# Patient Record
Sex: FEMALE | Race: WHITE | URBAN - METROPOLITAN AREA
[De-identification: names, ages, dates, MRNs, and addresses within clinical notes are randomized per-mention and may not be internally consistent; named-entity substitution may affect disease eponyms.]

---

## 2017-04-21 ENCOUNTER — IMPORTED ENCOUNTER (OUTPATIENT)
Dept: URBAN - METROPOLITAN AREA CLINIC 50 | Facility: CLINIC | Age: 72
End: 2017-04-21

## 2017-12-08 ENCOUNTER — IMPORTED ENCOUNTER (OUTPATIENT)
Dept: URBAN - METROPOLITAN AREA CLINIC 50 | Facility: CLINIC | Age: 72
End: 2017-12-08

## 2018-08-03 ENCOUNTER — IMPORTED ENCOUNTER (OUTPATIENT)
Dept: URBAN - METROPOLITAN AREA CLINIC 50 | Facility: CLINIC | Age: 73
End: 2018-08-03

## 2018-08-13 ENCOUNTER — IMPORTED ENCOUNTER (OUTPATIENT)
Dept: URBAN - METROPOLITAN AREA CLINIC 50 | Facility: CLINIC | Age: 73
End: 2018-08-13

## 2018-10-09 NOTE — PATIENT DISCUSSION
REFRACTIVE ERROR, OU - DISCUSSED OPTION OF CORRECTING AT THE TIME OF CATARACT SURGERY. PT UNDERSTANDS AND ELECTS TO CONSIDER HIS OPTIONS. DISCUSSED BEST QUALITY OF DISTANCE VISION AND PATIENT OKAY WITH WEARING READING GLASSES FOR NEAR.

## 2018-10-09 NOTE — PATIENT DISCUSSION
Chest Wall Pain: Costochondritis    The chest pain that you have had today is caused by costochondritis. This condition is caused by an inflammation of the cartilage joining your ribs to your breastbone. It is not caused by heart or lung problems. The inflammation may have been brought on by a blow to the chest, lifting heavy objects, intense exercise, or an illness that made you cough and sneeze. It often occurs during times of emotional stress. It can be painful, but it is not dangerous. It usually goes away in 1 to 2 weeks. But it may happen again. Rarely, a more serious condition may cause symptoms similar to costochondritis. That’s why it’s important to watch for the warning signs listed below.  Home care  Follow these guidelines when caring for yourself at home:  · If you feel that emotional stress is a cause of your condition, try to figure out the sources of that stress. It may not be obvious! Learn ways to deal with the stress in your life. This can include regular exercise, muscle relaxation, meditation, or simply taking time out for yourself. For more information about this, talk with your health care provider. Or go to your local library and look at books on “stress reduction.”  · You may use acetaminophen or ibuprofen to control pain, unless another pain medicine was prescribed. If you have liver disease or ever had a stomach ulcer, talk with your health care provider before using these medicines.  · You can also help ease pain by using a hot, wet compress or heating pad. Use this with or without a medicated skin cream that helps relieves pain.  · Do stretching exercise as advised by your provider.  · Take any prescribed medicines as directed.  Follow-up care  Follow up with your health care provider, or as advised, if you do not start to get better in the next 2 days.  When to seek medical advice  Call your health care provider right away if any of these occur:  · A change in the type of pain. Call  Refractive Error Counseling, OU -  I have discussed the options for refractive surgery to decrease dependency on glasses and/or contact lenses after cataract surgery. It was discussed that while the success rate of cataract surgery is high (approximately 99 percent), success being defined as removing a cloudy lens and replacing it with a clear artificial lens, the success rate of getting the prescription corrected to meet or approximate the patients goals (within 0.5 diopters) is lower (about 75-80%). These options include: correction of astigmatism with limbal relaxing incision(s), LenSx arcuate incision(s) and/or TORIC IOL, monovision, nanovision, multifocal IOL, and EDOF IOL . It was emphasized to the patient that the goal of reducing spectacle dependence not spectacle freedom is more realistic. The patient understands it is possible they may still need a weak spectacle prescription and/or further refractive surgery to achieve their visual goal after surgery. It was explained that in some cases further refractive surgery may not be possible and glasses and/or contacts may be needed to achieve their best vision. No visual outcome was guaranteed. if it feels different, becomes more serious, lasts longer, or spreads into your shoulder, arm, neck, jaw, or back.  · Shortness of breath or pain gets worse when you breathe  · Weakness, dizziness, or fainting  · Cough with dark-colored sputum (phlegm) or blood  · Abdominal pain  · Dark red or black stools  · Fever of 100.4ºF (38ºC) or higher, or as directed by your health care provider  © 6202-0419 [employer]. [address] [place] [address]. All rights reserved. This information is not intended as a substitute for professional medical care. Always follow your healthcare professional's instructions.    Take Naproxen as directed with food as needed for pain. May apply heat or ice for comfort. Follow up with PCP in 7-10 days if symptoms do not improve, sooner if symptoms worsen.

## 2018-10-09 NOTE — PATIENT DISCUSSION
CATARACT, OU - VISUALLY SIGNIFICANT OD &gt; OS. SCHEDULE SX OD THEN LATER IN OS IF VISUAL SYMPTOMS PERSIST.  GLS RX GIVEN TO FILL IF DESIRES IN THE EVENT PT DOES NOT PROCEED W/ SX.

## 2018-10-09 NOTE — PATIENT DISCUSSION
Surgery  Counseling: I have discussed the option of glasses versus cataract surgery versus following. It was explained that when vision no longer meets the patient's visual needs and a new prescription for glasses is not likely to improve the patient's visual symptoms, the option of cataract surgery is a reasonable next step. It was explained that there is no guarantee that removing the cataract will improve their visual symptoms, however; it is believed that the cataract is contributing to the patient's visual impairment and surgery may significantly improve both the visual and functional status of the patient. The risks, benefits and alternatives of surgery were discussed with the patient. After this discussion, the patient desires to proceed with cataract surgery with implantation of an intraocular lens to improve vision and improving night vision.

## 2018-10-15 NOTE — PATIENT DISCUSSION
REFRACTIVE ERROR- OPTS DISC W/ PT. PT UNDERSTANDS AND ELECTS TO PROCEED W/ REFRACTIVE CATARACT SURGERY .

## 2018-10-25 NOTE — PATIENT DISCUSSION
Surgery  Counseling: I have discussed the option of glasses versus cataract surgery versus following . It was explained that when vision no longer meets the patient's visual needs and a new prescription for glasses is not likely to improve all of the patient's visual symptoms, the option of cataract surgery is a reasonable next step. It was explained that there is no guarantee that removing the cataract will improve their visual symptoms, however; it is believed that the cataract is contributing to the patient's visual impairment and surgery may significantly improve both the visual and functional status of the patient. The risks, benefits and alternatives of surgery were discussed with the patient. After this discussion, the patient desires to proceed with cataract surgery with implantation of an intraocular lens to improve vision to drive safely, read small print, watch TV and golf.

## 2018-10-25 NOTE — PATIENT DISCUSSION
***This patient had refractive cataract surgery performed. A monofocal IOL was placed to achieve a target refraction of plano (which should provide them with satisfactory distance vision.

## 2019-02-08 ENCOUNTER — IMPORTED ENCOUNTER (OUTPATIENT)
Dept: URBAN - METROPOLITAN AREA CLINIC 50 | Facility: CLINIC | Age: 74
End: 2019-02-08

## 2019-07-19 ENCOUNTER — IMPORTED ENCOUNTER (OUTPATIENT)
Dept: URBAN - METROPOLITAN AREA CLINIC 50 | Facility: CLINIC | Age: 74
End: 2019-07-19

## 2019-07-19 NOTE — PATIENT DISCUSSION
"""Continue Artificial tears both eyes two - four times a day ."" ""Start Warm compresses both eyes twice a day . """

## 2020-01-24 ENCOUNTER — IMPORTED ENCOUNTER (OUTPATIENT)
Dept: URBAN - METROPOLITAN AREA CLINIC 50 | Facility: CLINIC | Age: 75
End: 2020-01-24

## 2020-07-24 ENCOUNTER — IMPORTED ENCOUNTER (OUTPATIENT)
Dept: URBAN - METROPOLITAN AREA CLINIC 50 | Facility: CLINIC | Age: 75
End: 2020-07-24

## 2021-03-12 ENCOUNTER — IMPORTED ENCOUNTER (OUTPATIENT)
Dept: URBAN - METROPOLITAN AREA CLINIC 50 | Facility: CLINIC | Age: 76
End: 2021-03-12

## 2021-04-18 ASSESSMENT — VISUAL ACUITY
OD_CC: 20/30
OD_CC: J5
OS_CC: 20/25-2
OS_OTHER: 20/70. 20/80.
OD_CC: 20/30
OS_CC: J2@ 15 IN
OS_BAT: 20/70
OS_OTHER: >20/400.
OS_CC: 20/30
OD_CC: 20/30+2
OD_PH: @ 14 IN
OD_BAT: 20/60
OD_CC: J2@ 14 IN
OS_CC: J2
OS_CC: 20/25
OS_CC: 20/30
OS_CC: 20/40
OD_OTHER: >20/400.
OD_CC: J2@ 16 IN
OD_CC: 20/25-2
OD_CC: J1@ 14 IN
OS_PH: @ 14 IN
OS_CC: 20/30
OS_CC: 20/30
OS_CC: J1@ 14 IN
OD_CC: 20/25
OD_CC: 20/25-2
OD_PH: @ 15 IN
OD_CC: J2
OS_CC: J5
OD_CC: J2
OS_BAT: 20/70
OS_CC: J2
OD_CC: 20/30-2
OS_OTHER: 20/70. 20/>400.
OS_CC: 20/25-3
OD_CC: 20/40
OS_CC: 20/30
OD_BAT: 20/>400
OD_OTHER: 20/60. 20/80.
OD_OTHER: 20/>400.
OS_CC: 20/40
OD_CC: 20/25-2
OS_PH: @ 15 IN
OD_CC: J2@ 15 IN
OS_CC: J2@ 14 IN
OS_CC: J2@ 16 IN

## 2021-04-18 ASSESSMENT — TONOMETRY
OS_IOP_MMHG: 12
OS_IOP_MMHG: 14
OS_IOP_MMHG: 16
OS_IOP_MMHG: 13
OS_IOP_MMHG: 15
OD_IOP_MMHG: 14
OS_IOP_MMHG: 14
OS_IOP_MMHG: 15
OD_IOP_MMHG: 14
OD_IOP_MMHG: 15
OD_IOP_MMHG: 13
OS_IOP_MMHG: 12
OD_IOP_MMHG: 15
OD_IOP_MMHG: 13
OD_IOP_MMHG: 14
OD_IOP_MMHG: 12

## 2021-07-27 NOTE — PATIENT DISCUSSION
GLAUCOMA SUSPECT, OU - OPTIC NERVE CUPPING/ASYMMETRY. IOP WITHIN ACCEPTABLE LIMITS OU. +FHX OF [facility] (MOTHER). OCT TODAY SHOWS NO RNFL THINNING OU. MONITOR YEARLY WITH OCT AND ORDER VF IF ANY CHANGES.

## 2021-09-03 ENCOUNTER — PREPPED CHART (OUTPATIENT)
Dept: URBAN - METROPOLITAN AREA CLINIC 50 | Facility: CLINIC | Age: 76
End: 2021-09-03

## 2021-09-24 ENCOUNTER — 6 MONTH COMPREHENSIVE EXAM (OUTPATIENT)
Dept: URBAN - METROPOLITAN AREA CLINIC 50 | Facility: CLINIC | Age: 76
End: 2021-09-24

## 2021-09-24 DIAGNOSIS — H04.123: ICD-10-CM

## 2021-09-24 DIAGNOSIS — H43.813: ICD-10-CM

## 2021-09-24 DIAGNOSIS — H35.3132: ICD-10-CM

## 2021-09-24 PROCEDURE — 92014 COMPRE OPH EXAM EST PT 1/>: CPT

## 2021-09-24 PROCEDURE — 92134 CPTRZ OPH DX IMG PST SGM RTA: CPT

## 2021-09-24 PROCEDURE — 92015 DETERMINE REFRACTIVE STATE: CPT

## 2021-09-24 ASSESSMENT — VISUAL ACUITY
OS_CC: 20/30
OD_CC: 20/40
OU_CC: J3@ 14 IN
OU_CC: 20/30

## 2021-09-24 ASSESSMENT — TONOMETRY
OD_IOP_MMHG: 12
OS_IOP_MMHG: 11

## 2022-08-19 ENCOUNTER — COMPREHENSIVE EXAM (OUTPATIENT)
Dept: URBAN - METROPOLITAN AREA CLINIC 50 | Facility: CLINIC | Age: 77
End: 2022-08-19

## 2022-08-19 DIAGNOSIS — H43.813: ICD-10-CM

## 2022-08-19 DIAGNOSIS — H35.373: ICD-10-CM

## 2022-08-19 DIAGNOSIS — H04.123: ICD-10-CM

## 2022-08-19 DIAGNOSIS — H35.3132: ICD-10-CM

## 2022-08-19 PROCEDURE — 92134 CPTRZ OPH DX IMG PST SGM RTA: CPT

## 2022-08-19 PROCEDURE — 92014 COMPRE OPH EXAM EST PT 1/>: CPT

## 2022-08-19 ASSESSMENT — VISUAL ACUITY
OD_CC: 20/40
OS_CC: 20/30
OU_CC: J3

## 2022-08-19 ASSESSMENT — TONOMETRY
OD_IOP_MMHG: 15
OS_IOP_MMHG: 13

## 2022-08-19 NOTE — PATIENT DISCUSSION
Advised patient to consult with Dr. Qeuvedo Like to see if vision can be improved. Did not give glasses Rx today nor charge for it.

## 2023-08-31 ENCOUNTER — COMPREHENSIVE EXAM (OUTPATIENT)
Dept: URBAN - METROPOLITAN AREA CLINIC 49 | Facility: LOCATION | Age: 78
End: 2023-08-31

## 2023-08-31 DIAGNOSIS — H43.813: ICD-10-CM

## 2023-08-31 DIAGNOSIS — H04.123: ICD-10-CM

## 2023-08-31 DIAGNOSIS — H35.3132: ICD-10-CM

## 2023-08-31 DIAGNOSIS — H52.4: ICD-10-CM

## 2023-08-31 DIAGNOSIS — H35.373: ICD-10-CM

## 2023-08-31 PROCEDURE — 99214 OFFICE O/P EST MOD 30 MIN: CPT

## 2023-08-31 PROCEDURE — 92134 CPTRZ OPH DX IMG PST SGM RTA: CPT

## 2023-08-31 PROCEDURE — 92133 CPTRZD OPH DX IMG PST SGM ON: CPT

## 2023-08-31 ASSESSMENT — TONOMETRY
OD_IOP_MMHG: 12
OS_IOP_MMHG: 12

## 2023-08-31 ASSESSMENT — VISUAL ACUITY
OD_CC: 20/100+2
OD_PH: 20/70
OS_CC: 20/70
OU_CC: 20/40

## 2024-09-05 ENCOUNTER — COMPREHENSIVE EXAM (OUTPATIENT)
Dept: URBAN - METROPOLITAN AREA CLINIC 49 | Facility: LOCATION | Age: 79
End: 2024-09-05

## 2024-09-05 DIAGNOSIS — H40.023: ICD-10-CM

## 2024-09-05 DIAGNOSIS — H35.3132: ICD-10-CM

## 2024-09-05 DIAGNOSIS — H04.123: ICD-10-CM

## 2024-09-05 DIAGNOSIS — H35.373: ICD-10-CM

## 2024-09-05 DIAGNOSIS — H43.813: ICD-10-CM

## 2024-09-05 PROCEDURE — 99214 OFFICE O/P EST MOD 30 MIN: CPT

## 2024-09-05 PROCEDURE — 92134 CPTRZ OPH DX IMG PST SGM RTA: CPT

## 2024-11-08 ENCOUNTER — DIAGNOSTICS ONLY (OUTPATIENT)
Dept: URBAN - METROPOLITAN AREA CLINIC 49 | Facility: LOCATION | Age: 79
End: 2024-11-08

## 2024-11-08 DIAGNOSIS — H40.023: ICD-10-CM

## 2024-11-08 PROCEDURE — 92133 CPTRZD OPH DX IMG PST SGM ON: CPT

## 2024-11-08 PROCEDURE — 92083 EXTENDED VISUAL FIELD XM: CPT
